# Patient Record
Sex: FEMALE | Race: ASIAN | URBAN - NONMETROPOLITAN AREA
[De-identification: names, ages, dates, MRNs, and addresses within clinical notes are randomized per-mention and may not be internally consistent; named-entity substitution may affect disease eponyms.]

---

## 2022-04-06 ENCOUNTER — APPOINTMENT (RX ONLY)
Dept: URBAN - NONMETROPOLITAN AREA CLINIC 1 | Facility: CLINIC | Age: 32
Setting detail: DERMATOLOGY
End: 2022-04-06

## 2022-04-06 DIAGNOSIS — B07.8 OTHER VIRAL WARTS: ICD-10-CM

## 2022-04-06 PROCEDURE — ? COUNSELING

## 2022-04-06 PROCEDURE — ? ADDITIONAL NOTES

## 2022-04-06 PROCEDURE — ? LIQUID NITROGEN

## 2022-04-06 PROCEDURE — 17110 DESTRUCTION B9 LES UP TO 14: CPT

## 2022-04-06 PROCEDURE — ? FULL BODY SKIN EXAM - DECLINED

## 2022-04-06 PROCEDURE — ? TREATMENT REGIMEN

## 2022-04-06 ASSESSMENT — LOCATION DETAILED DESCRIPTION DERM
LOCATION DETAILED: RIGHT PROXIMAL PALMAR INDEX FINGER
LOCATION DETAILED: RIGHT MEDIAL DORSAL WRIST

## 2022-04-06 ASSESSMENT — LOCATION ZONE DERM
LOCATION ZONE: ARM
LOCATION ZONE: FINGER

## 2022-04-06 ASSESSMENT — LOCATION SIMPLE DESCRIPTION DERM
LOCATION SIMPLE: RIGHT INDEX FINGER
LOCATION SIMPLE: RIGHT WRIST

## 2022-04-06 NOTE — PROCEDURE: LIQUID NITROGEN
Spray Paint Technique: No
Post-Care Instructions: I reviewed with the patient in detail post-care instructions. Patient is to wear sunprotection, and avoid picking at any of the treated lesions. Pt may apply Vaseline to crusted or scabbing areas.
Show Topical Anesthesia Variable?: Yes
Medical Necessity Information: It is in your best interest to select a reason for this procedure from the list below. All of these items fulfill various CMS LCD requirements except the new and changing color options.
Medical Necessity Clause: This procedure was medically necessary because the lesions that were treated were:
Number Of Freeze-Thaw Cycles: 2 freeze-thaw cycles
Detail Level: Detailed
Consent: The patient's consent was obtained including but not limited to risks of crusting, scabbing, blistering, scarring, darker or lighter pigmentary change, recurrence, incomplete removal and infection.
Spray Paint Text: The liquid nitrogen was applied to the skin utilizing a spray paint frosting technique.

## 2022-04-28 ENCOUNTER — APPOINTMENT (RX ONLY)
Dept: URBAN - NONMETROPOLITAN AREA CLINIC 1 | Facility: CLINIC | Age: 32
Setting detail: DERMATOLOGY
End: 2022-04-28

## 2022-04-28 DIAGNOSIS — B07.8 OTHER VIRAL WARTS: ICD-10-CM

## 2022-04-28 PROCEDURE — ? LIQUID NITROGEN

## 2022-04-28 PROCEDURE — 17110 DESTRUCTION B9 LES UP TO 14: CPT

## 2022-04-28 PROCEDURE — ? COUNSELING

## 2022-04-28 PROCEDURE — ? PHOTO-DOCUMENTATION

## 2022-04-28 PROCEDURE — ? TREATMENT REGIMEN

## 2022-04-28 ASSESSMENT — LOCATION SIMPLE DESCRIPTION DERM
LOCATION SIMPLE: RIGHT WRIST
LOCATION SIMPLE: RIGHT INDEX FINGER

## 2022-04-28 ASSESSMENT — LOCATION ZONE DERM
LOCATION ZONE: ARM
LOCATION ZONE: FINGER

## 2022-04-28 ASSESSMENT — LOCATION DETAILED DESCRIPTION DERM
LOCATION DETAILED: RIGHT PROXIMAL PALMAR INDEX FINGER
LOCATION DETAILED: RIGHT MEDIAL DORSAL WRIST

## 2022-04-28 NOTE — PROCEDURE: LIQUID NITROGEN
Add 52 Modifier (Optional): no
Detail Level: Detailed
Spray Paint Text: The liquid nitrogen was applied to the skin utilizing a spray paint frosting technique.
Consent: The patient's consent was obtained including but not limited to risks of crusting, scabbing, blistering, scarring, darker or lighter pigmentary change, recurrence, incomplete removal and infection.
Medical Necessity Information: It is in your best interest to select a reason for this procedure from the list below. All of these items fulfill various CMS LCD requirements except the new and changing color options.
Show Aperture Variable?: Yes
Number Of Freeze-Thaw Cycles: 2 freeze-thaw cycles
Medical Necessity Clause: This procedure was medically necessary because the lesions that were treated were:
Post-Care Instructions: I reviewed with the patient in detail post-care instructions. Patient is to wear sunprotection, and avoid picking at any of the treated lesions. Pt may apply Vaseline to crusted or scabbing areas.

## 2022-06-08 ENCOUNTER — APPOINTMENT (RX ONLY)
Dept: URBAN - NONMETROPOLITAN AREA CLINIC 1 | Facility: CLINIC | Age: 32
Setting detail: DERMATOLOGY
End: 2022-06-08

## 2022-06-08 DIAGNOSIS — B07.8 OTHER VIRAL WARTS: ICD-10-CM

## 2022-06-08 PROCEDURE — ? COUNSELING

## 2022-06-08 PROCEDURE — ? LIQUID NITROGEN

## 2022-06-08 PROCEDURE — ? TREATMENT REGIMEN

## 2022-06-08 PROCEDURE — 17110 DESTRUCTION B9 LES UP TO 14: CPT

## 2022-06-08 ASSESSMENT — LOCATION ZONE DERM
LOCATION ZONE: ARM
LOCATION ZONE: FINGER

## 2022-06-08 ASSESSMENT — LOCATION DETAILED DESCRIPTION DERM
LOCATION DETAILED: RIGHT PROXIMAL PALMAR INDEX FINGER
LOCATION DETAILED: RIGHT MEDIAL DORSAL WRIST

## 2022-06-08 ASSESSMENT — LOCATION SIMPLE DESCRIPTION DERM
LOCATION SIMPLE: RIGHT INDEX FINGER
LOCATION SIMPLE: RIGHT WRIST

## 2022-06-14 ENCOUNTER — APPOINTMENT (RX ONLY)
Dept: URBAN - NONMETROPOLITAN AREA CLINIC 1 | Facility: CLINIC | Age: 32
Setting detail: DERMATOLOGY
End: 2022-06-14

## 2022-06-14 DIAGNOSIS — B07.8 OTHER VIRAL WARTS: ICD-10-CM

## 2022-06-14 PROCEDURE — ? LIQUID NITROGEN

## 2022-06-14 PROCEDURE — ? FULL BODY SKIN EXAM - DECLINED

## 2022-06-14 PROCEDURE — ? COUNSELING

## 2022-06-14 PROCEDURE — 17110 DESTRUCTION B9 LES UP TO 14: CPT | Mod: 79

## 2022-06-14 PROCEDURE — ? TREATMENT REGIMEN

## 2022-06-14 ASSESSMENT — LOCATION SIMPLE DESCRIPTION DERM
LOCATION SIMPLE: RIGHT INDEX FINGER
LOCATION SIMPLE: LEFT SMALL FINGER
LOCATION SIMPLE: RIGHT WRIST
LOCATION SIMPLE: LEFT MIDDLE FINGER

## 2022-06-14 ASSESSMENT — LOCATION DETAILED DESCRIPTION DERM
LOCATION DETAILED: LEFT SMALL FINGERTIP
LOCATION DETAILED: RIGHT MEDIAL DORSAL WRIST
LOCATION DETAILED: RIGHT PROXIMAL PALMAR INDEX FINGER
LOCATION DETAILED: LEFT DISTAL PALMAR MIDDLE FINGER

## 2022-06-14 ASSESSMENT — LOCATION ZONE DERM
LOCATION ZONE: FINGER
LOCATION ZONE: ARM

## 2022-06-14 NOTE — PROCEDURE: LIQUID NITROGEN
Consent: The patient's consent was obtained including but not limited to risks of crusting, scabbing, blistering, scarring, darker or lighter pigmentary change, recurrence, incomplete removal and infection.
Show Topical Anesthesia Variable?: Yes
Add 52 Modifier (Optional): no
Detail Level: Detailed
Post-Care Instructions: I reviewed with the patient in detail post-care instructions. Patient is to wear sunprotection, and avoid picking at any of the treated lesions. Pt may apply Vaseline to crusted or scabbing areas.
Medical Necessity Clause: This procedure was medically necessary because the lesions that were treated were:
Number Of Freeze-Thaw Cycles: 2 freeze-thaw cycles
Medical Necessity Information: It is in your best interest to select a reason for this procedure from the list below. All of these items fulfill various CMS LCD requirements except the new and changing color options.
Spray Paint Text: The liquid nitrogen was applied to the skin utilizing a spray paint frosting technique.

## 2022-07-13 ENCOUNTER — APPOINTMENT (RX ONLY)
Dept: URBAN - NONMETROPOLITAN AREA CLINIC 1 | Facility: CLINIC | Age: 32
Setting detail: DERMATOLOGY
End: 2022-07-13

## 2022-07-13 DIAGNOSIS — B07.8 OTHER VIRAL WARTS: ICD-10-CM

## 2022-07-13 PROCEDURE — ? FULL BODY SKIN EXAM - DECLINED

## 2022-07-13 PROCEDURE — ? LIQUID NITROGEN

## 2022-07-13 PROCEDURE — ? TREATMENT REGIMEN

## 2022-07-13 PROCEDURE — 17110 DESTRUCTION B9 LES UP TO 14: CPT

## 2022-07-13 PROCEDURE — ? COUNSELING

## 2022-07-13 ASSESSMENT — LOCATION DETAILED DESCRIPTION DERM: LOCATION DETAILED: RIGHT MEDIAL DORSAL WRIST

## 2022-07-13 ASSESSMENT — LOCATION ZONE DERM: LOCATION ZONE: ARM

## 2022-07-13 ASSESSMENT — LOCATION SIMPLE DESCRIPTION DERM: LOCATION SIMPLE: RIGHT WRIST

## 2022-07-13 NOTE — PROCEDURE: LIQUID NITROGEN
Spray Paint Text: The liquid nitrogen was applied to the skin utilizing a spray paint frosting technique.
Post-Care Instructions: I reviewed with the patient in detail post-care instructions. Patient is to wear sunprotection, and avoid picking at any of the treated lesions. Pt may apply Vaseline to crusted or scabbing areas.
Show Spray Paint Technique Variable?: Yes
Medical Necessity Information: It is in your best interest to select a reason for this procedure from the list below. All of these items fulfill various CMS LCD requirements except the new and changing color options.
Consent: The patient's consent was obtained including but not limited to risks of crusting, scabbing, blistering, scarring, darker or lighter pigmentary change, recurrence, incomplete removal and infection.
Add 52 Modifier (Optional): no
Medical Necessity Clause: This procedure was medically necessary because the lesions that were treated were:
Number Of Freeze-Thaw Cycles: 2 freeze-thaw cycles
Detail Level: Detailed

## 2022-07-27 ENCOUNTER — APPOINTMENT (RX ONLY)
Dept: URBAN - NONMETROPOLITAN AREA CLINIC 1 | Facility: CLINIC | Age: 32
Setting detail: DERMATOLOGY
End: 2022-07-27

## 2022-07-27 DIAGNOSIS — B07.8 OTHER VIRAL WARTS: ICD-10-CM

## 2022-07-27 PROCEDURE — ? TREATMENT REGIMEN

## 2022-07-27 PROCEDURE — ? LIQUID NITROGEN

## 2022-07-27 PROCEDURE — 17110 DESTRUCTION B9 LES UP TO 14: CPT

## 2022-07-27 PROCEDURE — ? COUNSELING

## 2022-07-27 PROCEDURE — ? FULL BODY SKIN EXAM - DECLINED

## 2022-07-27 ASSESSMENT — LOCATION DETAILED DESCRIPTION DERM: LOCATION DETAILED: RIGHT MEDIAL DORSAL WRIST

## 2022-07-27 ASSESSMENT — LOCATION ZONE DERM: LOCATION ZONE: ARM

## 2022-07-27 ASSESSMENT — LOCATION SIMPLE DESCRIPTION DERM: LOCATION SIMPLE: RIGHT WRIST

## 2022-07-27 NOTE — PROCEDURE: LIQUID NITROGEN
Show Spray Paint Technique Variable?: Yes
Detail Level: Detailed
Post-Care Instructions: I reviewed with the patient in detail post-care instructions. Patient is to wear sunprotection, and avoid picking at any of the treated lesions. Pt may apply Vaseline to crusted or scabbing areas.
Spray Paint Text: The liquid nitrogen was applied to the skin utilizing a spray paint frosting technique.
Add 52 Modifier (Optional): no
Consent: The patient's consent was obtained including but not limited to risks of crusting, scabbing, blistering, scarring, darker or lighter pigmentary change, recurrence, incomplete removal and infection.
Number Of Freeze-Thaw Cycles: 2 freeze-thaw cycles
Medical Necessity Information: It is in your best interest to select a reason for this procedure from the list below. All of these items fulfill various CMS LCD requirements except the new and changing color options.
Medical Necessity Clause: This procedure was medically necessary because the lesions that were treated were:

## 2022-08-18 ENCOUNTER — APPOINTMENT (RX ONLY)
Dept: URBAN - NONMETROPOLITAN AREA CLINIC 1 | Facility: CLINIC | Age: 32
Setting detail: DERMATOLOGY
End: 2022-08-18

## 2022-08-18 DIAGNOSIS — B07.8 OTHER VIRAL WARTS: ICD-10-CM

## 2022-08-18 PROCEDURE — ? TREATMENT REGIMEN

## 2022-08-18 PROCEDURE — ? FULL BODY SKIN EXAM - DECLINED

## 2022-08-18 PROCEDURE — ? LIQUID NITROGEN

## 2022-08-18 PROCEDURE — ? COUNSELING

## 2022-08-18 PROCEDURE — 17110 DESTRUCTION B9 LES UP TO 14: CPT

## 2022-08-18 ASSESSMENT — LOCATION SIMPLE DESCRIPTION DERM: LOCATION SIMPLE: RIGHT WRIST

## 2022-08-18 ASSESSMENT — LOCATION ZONE DERM: LOCATION ZONE: ARM

## 2022-08-18 ASSESSMENT — LOCATION DETAILED DESCRIPTION DERM: LOCATION DETAILED: RIGHT MEDIAL DORSAL WRIST

## 2022-09-07 ENCOUNTER — APPOINTMENT (RX ONLY)
Dept: URBAN - NONMETROPOLITAN AREA CLINIC 1 | Facility: CLINIC | Age: 32
Setting detail: DERMATOLOGY
End: 2022-09-07

## 2022-09-07 DIAGNOSIS — B07.8 OTHER VIRAL WARTS: ICD-10-CM

## 2022-09-07 PROCEDURE — ? COUNSELING

## 2022-09-07 PROCEDURE — ? LIQUID NITROGEN

## 2022-09-07 PROCEDURE — 17110 DESTRUCTION B9 LES UP TO 14: CPT

## 2022-09-07 PROCEDURE — ? FULL BODY SKIN EXAM - DECLINED

## 2022-09-07 PROCEDURE — ? TREATMENT REGIMEN

## 2022-09-07 ASSESSMENT — LOCATION DETAILED DESCRIPTION DERM: LOCATION DETAILED: RIGHT DISTAL DORSAL FOREARM

## 2022-09-07 ASSESSMENT — LOCATION ZONE DERM: LOCATION ZONE: ARM

## 2022-09-07 ASSESSMENT — LOCATION SIMPLE DESCRIPTION DERM: LOCATION SIMPLE: RIGHT FOREARM

## 2022-09-07 NOTE — PROCEDURE: LIQUID NITROGEN
Show Topical Anesthesia Variable?: Yes
Medical Necessity Information: It is in your best interest to select a reason for this procedure from the list below. All of these items fulfill various CMS LCD requirements except the new and changing color options.
Render Post-Care Instructions In Note?: no
Number Of Freeze-Thaw Cycles: 2 freeze-thaw cycles
Medical Necessity Clause: This procedure was medically necessary because the lesions that were treated were:
Detail Level: Detailed
Post-Care Instructions: I reviewed with the patient in detail post-care instructions. Patient is to wear sunprotection, and avoid picking at any of the treated lesions. Pt may apply Vaseline to crusted or scabbing areas.
Spray Paint Text: The liquid nitrogen was applied to the skin utilizing a spray paint frosting technique.
Consent: The patient's consent was obtained including but not limited to risks of crusting, scabbing, blistering, scarring, darker or lighter pigmentary change, recurrence, incomplete removal and infection.

## 2022-09-29 ENCOUNTER — APPOINTMENT (RX ONLY)
Dept: URBAN - NONMETROPOLITAN AREA CLINIC 1 | Facility: CLINIC | Age: 32
Setting detail: DERMATOLOGY
End: 2022-09-29

## 2022-09-29 DIAGNOSIS — B07.8 OTHER VIRAL WARTS: ICD-10-CM

## 2022-09-29 PROCEDURE — ? COUNSELING

## 2022-09-29 PROCEDURE — ? FULL BODY SKIN EXAM - DECLINED

## 2022-09-29 PROCEDURE — ? LIQUID NITROGEN

## 2022-09-29 PROCEDURE — 17110 DESTRUCTION B9 LES UP TO 14: CPT

## 2022-09-29 ASSESSMENT — LOCATION ZONE DERM
LOCATION ZONE: FINGER
LOCATION ZONE: ARM

## 2022-09-29 ASSESSMENT — LOCATION DETAILED DESCRIPTION DERM
LOCATION DETAILED: RIGHT DISTAL DORSAL FOREARM
LOCATION DETAILED: LEFT SMALL FINGERTIP

## 2022-09-29 ASSESSMENT — LOCATION SIMPLE DESCRIPTION DERM
LOCATION SIMPLE: RIGHT FOREARM
LOCATION SIMPLE: LEFT SMALL FINGER

## 2022-09-29 NOTE — PROCEDURE: LIQUID NITROGEN
Render Note In Bullet Format When Appropriate: No
Spray Paint Text: The liquid nitrogen was applied to the skin utilizing a spray paint frosting technique.
Show Applicator Variable?: Yes
Number Of Freeze-Thaw Cycles: 2 freeze-thaw cycles
Post-Care Instructions: I reviewed with the patient in detail post-care instructions. Patient is to wear sunprotection, and avoid picking at any of the treated lesions. Pt may apply Vaseline to crusted or scabbing areas.
Detail Level: Detailed
Consent: The patient's consent was obtained including but not limited to risks of crusting, scabbing, blistering, scarring, darker or lighter pigmentary change, recurrence, incomplete removal and infection.
Medical Necessity Information: It is in your best interest to select a reason for this procedure from the list below. All of these items fulfill various CMS LCD requirements except the new and changing color options.
Medical Necessity Clause: This procedure was medically necessary because the lesions that were treated were:

## 2022-10-20 ENCOUNTER — APPOINTMENT (RX ONLY)
Dept: URBAN - NONMETROPOLITAN AREA CLINIC 1 | Facility: CLINIC | Age: 32
Setting detail: DERMATOLOGY
End: 2022-10-20

## 2022-10-20 DIAGNOSIS — B07.8 OTHER VIRAL WARTS: ICD-10-CM

## 2022-10-20 PROCEDURE — ? COUNSELING

## 2022-10-20 PROCEDURE — 17110 DESTRUCTION B9 LES UP TO 14: CPT

## 2022-10-20 PROCEDURE — ? FULL BODY SKIN EXAM - DECLINED

## 2022-10-20 PROCEDURE — ? LIQUID NITROGEN

## 2022-10-20 ASSESSMENT — LOCATION DETAILED DESCRIPTION DERM
LOCATION DETAILED: RIGHT DISTAL DORSAL FOREARM
LOCATION DETAILED: LEFT SMALL FINGERTIP

## 2022-10-20 ASSESSMENT — LOCATION ZONE DERM
LOCATION ZONE: FINGER
LOCATION ZONE: ARM

## 2022-10-20 ASSESSMENT — LOCATION SIMPLE DESCRIPTION DERM
LOCATION SIMPLE: RIGHT FOREARM
LOCATION SIMPLE: LEFT SMALL FINGER

## 2023-03-30 ENCOUNTER — APPOINTMENT (RX ONLY)
Dept: URBAN - NONMETROPOLITAN AREA CLINIC 1 | Facility: CLINIC | Age: 33
Setting detail: DERMATOLOGY
End: 2023-03-30

## 2023-03-30 DIAGNOSIS — B07.8 OTHER VIRAL WARTS: ICD-10-CM

## 2023-03-30 PROCEDURE — ? FULL BODY SKIN EXAM - DECLINED

## 2023-03-30 PROCEDURE — 17110 DESTRUCTION B9 LES UP TO 14: CPT

## 2023-03-30 PROCEDURE — ? LIQUID NITROGEN

## 2023-03-30 ASSESSMENT — LOCATION SIMPLE DESCRIPTION DERM: LOCATION SIMPLE: RIGHT FOREARM

## 2023-03-30 ASSESSMENT — LOCATION ZONE DERM: LOCATION ZONE: ARM

## 2023-03-30 ASSESSMENT — LOCATION DETAILED DESCRIPTION DERM: LOCATION DETAILED: RIGHT DISTAL DORSAL FOREARM

## 2023-03-30 NOTE — PROCEDURE: LIQUID NITROGEN
Render Note In Bullet Format When Appropriate: No
Medical Necessity Clause: This procedure was medically necessary because the lesions that were treated were:
Detail Level: Detailed
Post-Care Instructions: I reviewed with the patient in detail post-care instructions. Patient is to wear sunprotection, and avoid picking at any of the treated lesions. Pt may apply Vaseline to crusted or scabbing areas.
Consent: The patient's consent was obtained including but not limited to risks of crusting, scabbing, blistering, scarring, darker or lighter pigmentary change, recurrence, incomplete removal and infection.
Medical Necessity Information: It is in your best interest to select a reason for this procedure from the list below. All of these items fulfill various CMS LCD requirements except the new and changing color options.
Spray Paint Text: The liquid nitrogen was applied to the skin utilizing a spray paint frosting technique.
Show Spray Paint Technique Variable?: Yes
Number Of Freeze-Thaw Cycles: 2 freeze-thaw cycles